# Patient Record
(demographics unavailable — no encounter records)

---

## 2024-11-11 NOTE — PHYSICAL EXAM
[General Appearance - Alert] : alert [General Appearance - In No Acute Distress] : in no acute distress [Full Pulse] : the pedal pulses are present [Edema] : there was no peripheral edema [Skin Color & Pigmentation] : normal skin color and pigmentation [Skin Turgor] : normal skin turgor [] : no rash [Right Foot Was Examined] : The right foot was examined [Left Foot Was Examined] : The left foot was examined [2+] : 2+ in the dorsalis pedis [Deep Tendon Reflexes (DTR)] : deep tendon reflexes were 2+ and symmetric [Sensation] : the sensory exam was normal to light touch and pinprick [No Focal Deficits] : no focal deficits [Oriented To Time, Place, And Person] : oriented to person, place, and time [Impaired Insight] : insight and judgment were intact [Affect] : the affect was normal [1+] : left foot dorsalis pedis 1+ [Skin Lesions] : no skin lesions [de-identified] : caryl bunions, R>L [FreeTextEntry1] : elongated nails x10

## 2024-11-11 NOTE — HISTORY OF PRESENT ILLNESS
[FreeTextEntry1] : Patient is a 38 yr/o female with a history of cerebral palsy who presents to clinic complaining of elongated nails and right bunion pain.  -she would like an injection to help with pain

## 2024-11-11 NOTE — ASSESSMENT
[FreeTextEntry1] : Patient seen and evaluated\par  Debridement of nails x 10 \par  Discussed Bunion deformity - No surgery at this time. \par  pt received custom made shoes.\par  Patient can follow up in 2 month for routine care

## 2024-11-11 NOTE — PHYSICAL EXAM
[General Appearance - Alert] : alert [General Appearance - In No Acute Distress] : in no acute distress [Edema] : there was no peripheral edema [Full Pulse] : the pedal pulses are present [Skin Color & Pigmentation] : normal skin color and pigmentation [Skin Turgor] : normal skin turgor [] : no rash [Right Foot Was Examined] : The right foot was examined [Left Foot Was Examined] : The left foot was examined [2+] : 2+ in the dorsalis pedis [Deep Tendon Reflexes (DTR)] : deep tendon reflexes were 2+ and symmetric [Sensation] : the sensory exam was normal to light touch and pinprick [No Focal Deficits] : no focal deficits [Oriented To Time, Place, And Person] : oriented to person, place, and time [Impaired Insight] : insight and judgment were intact [Affect] : the affect was normal [1+] : left foot dorsalis pedis 1+ [Skin Lesions] : no skin lesions [de-identified] : caryl bunions, R>L [FreeTextEntry1] : elongated nails x10

## 2024-12-16 NOTE — HISTORY OF PRESENT ILLNESS
[FreeTextEntry1] : Patient is a 38 yr/o female with a history of cerebral palsy who presents to clinic complaining of elongated nails and right bunion pain.   Patient has right foot xerosis and area of bunion patient states it is due to her crawling and ambulating at physical therapy

## 2024-12-16 NOTE — PHYSICAL EXAM
[General Appearance - Alert] : alert [General Appearance - In No Acute Distress] : in no acute distress [Full Pulse] : the pedal pulses are present [Edema] : there was no peripheral edema [Skin Color & Pigmentation] : normal skin color and pigmentation [Skin Turgor] : normal skin turgor [] : no rash [Right Foot Was Examined] : The right foot was examined [Left Foot Was Examined] : The left foot was examined [2+] : 2+ in the dorsalis pedis [Deep Tendon Reflexes (DTR)] : deep tendon reflexes were 2+ and symmetric [Sensation] : the sensory exam was normal to light touch and pinprick [No Focal Deficits] : no focal deficits [Oriented To Time, Place, And Person] : oriented to person, place, and time [Impaired Insight] : insight and judgment were intact [Affect] : the affect was normal [1+] : left foot dorsalis pedis 1+ [Skin Lesions] : no skin lesions [de-identified] : caryl bunions, R>L [FreeTextEntry1] : elongated nails x10

## 2025-01-10 NOTE — HISTORY OF PRESENT ILLNESS
[de-identified] : Patient here for evaluation right knee pain. Complains of joint line pain Positive mechanical symptoms and catching pt with CP in wheel chair, able to walk at times, pain with transfers  knee pain has gotten better after surgery  does crawl a lot and has swelling anterior knee  NAD Right knee No skin breakdown swelling and slight erythema anterior knee Medial joint line ttp Positive kevin Negative lachman Negative varus/valgus instability ROM 0-130 Pain with forced extension and flexion NVI Compartments soft and NT  Xray reviewed and significant for right knee mild degenerative changes  Plan went over findings explainedn the imaging and tx rec cons tx  pt will send abx for possible cellulitis over the prepatellar bursitis

## 2025-01-15 NOTE — PHYSICAL EXAM
[General Appearance - Alert] : alert [General Appearance - In No Acute Distress] : in no acute distress [Full Pulse] : the pedal pulses are present [Edema] : there was no peripheral edema [Skin Color & Pigmentation] : normal skin color and pigmentation [Skin Turgor] : normal skin turgor [] : no rash [Right Foot Was Examined] : The right foot was examined [Left Foot Was Examined] : The left foot was examined [2+] : 2+ in the dorsalis pedis [Deep Tendon Reflexes (DTR)] : deep tendon reflexes were 2+ and symmetric [Sensation] : the sensory exam was normal to light touch and pinprick [No Focal Deficits] : no focal deficits [Oriented To Time, Place, And Person] : oriented to person, place, and time [Impaired Insight] : insight and judgment were intact [Affect] : the affect was normal [1+] : left foot dorsalis pedis 1+ [Skin Lesions] : no skin lesions [de-identified] : caryl bunions, R>L [FreeTextEntry1] : elongated nails x10

## 2025-01-15 NOTE — HISTORY OF PRESENT ILLNESS
[FreeTextEntry1] : Patient is a 38 yr/o female with a history of cerebral palsy who presents to clinic complaining of elongated nails and right bunion pain.  -Right foot xerosis and area of bunion patient states it is due to her crawling and ambulating at physical therapy

## 2025-01-15 NOTE — PHYSICAL EXAM
[General Appearance - Alert] : alert [General Appearance - In No Acute Distress] : in no acute distress [Full Pulse] : the pedal pulses are present [Edema] : there was no peripheral edema [Skin Color & Pigmentation] : normal skin color and pigmentation [Skin Turgor] : normal skin turgor [] : no rash [Right Foot Was Examined] : The right foot was examined [Left Foot Was Examined] : The left foot was examined [2+] : 2+ in the dorsalis pedis [Deep Tendon Reflexes (DTR)] : deep tendon reflexes were 2+ and symmetric [Sensation] : the sensory exam was normal to light touch and pinprick [No Focal Deficits] : no focal deficits [Oriented To Time, Place, And Person] : oriented to person, place, and time [Impaired Insight] : insight and judgment were intact [Affect] : the affect was normal [1+] : left foot dorsalis pedis 1+ [Skin Lesions] : no skin lesions [de-identified] : caryl bunions, R>L [FreeTextEntry1] : elongated nails x10

## 2025-03-16 NOTE — HISTORY OF PRESENT ILLNESS
[de-identified] : Patient here for evaluation right knee pain. Complains of joint line pain Positive mechanical symptoms and catching pt with CP in wheel chair, able to walk at times, pain with transfers  knee pain has gotten better after surgery  does crawl a lot and has swelling anterior knee  NAD Right knee No skin breakdown erythema and swelling have decreased Medial joint line ttp Positive kevin Negative lachman Negative varus/valgus instability ROM 0-130 Pain with forced extension and flexion NVI Compartments soft and NT  Xray reviewed and significant for right knee mild degenerative changes  Plan went over findings explained the imaging and tx cont cons tx hep, pain control as needed

## 2025-03-16 NOTE — HISTORY OF PRESENT ILLNESS
[de-identified] : Patient here for evaluation right knee pain. Complains of joint line pain Positive mechanical symptoms and catching pt with CP in wheel chair, able to walk at times, pain with transfers  knee pain has gotten better after surgery  does crawl a lot and has swelling anterior knee  NAD Right knee No skin breakdown erythema and swelling have decreased Medial joint line ttp Positive kevin Negative lachman Negative varus/valgus instability ROM 0-130 Pain with forced extension and flexion NVI Compartments soft and NT  Xray reviewed and significant for right knee mild degenerative changes  Plan went over findings explained the imaging and tx cont cons tx hep, pain control as needed

## 2025-04-07 NOTE — HISTORY OF PRESENT ILLNESS
[FreeTextEntry1] : Patient is a 38 yr/o female with a history of cerebral palsy who presents to clinic complaining of elongated nails and right bunion pain.  -Right foot xerosis and area of bunion patient states it is due to her crawling and ambulating at physical therapy  
bed rails

## 2025-04-07 NOTE — PHYSICAL EXAM
[General Appearance - Alert] : alert [General Appearance - In No Acute Distress] : in no acute distress [Full Pulse] : the pedal pulses are present [Edema] : there was no peripheral edema [Skin Color & Pigmentation] : normal skin color and pigmentation [Skin Turgor] : normal skin turgor [] : no rash [Right Foot Was Examined] : The right foot was examined [Left Foot Was Examined] : The left foot was examined [2+] : 2+ in the dorsalis pedis [Deep Tendon Reflexes (DTR)] : deep tendon reflexes were 2+ and symmetric [Sensation] : the sensory exam was normal to light touch and pinprick [No Focal Deficits] : no focal deficits [Oriented To Time, Place, And Person] : oriented to person, place, and time [Impaired Insight] : insight and judgment were intact [Affect] : the affect was normal [1+] : left foot dorsalis pedis 1+ [de-identified] : caryl bunions, R>L [Skin Lesions] : no skin lesions [FreeTextEntry1] : elongated nails x10

## 2025-06-12 NOTE — HISTORY OF PRESENT ILLNESS
[de-identified] : Patient here for evaluation right knee pain. Complains of joint line pain Positive mechanical symptoms and catching pt with CP in wheel chair, able to walk at times, pain with transfers  knee pain has gotten better after surgery  does crawl a lot and has swelling anterior knee  NAD Right knee No skin breakdown erythema and swelling have decreased Medial joint line ttp Positive kevin Negative lachman Negative varus/valgus instability ROM 0-130 Pain with forced extension and flexion NVI Compartments soft and NT  Xray reviewed and significant for right knee mild degenerative changes  Plan went over findings explained the imaging and tx cont cons tx hep, pain control as needed due to pain, will proceed with visco and will order

## 2025-07-17 NOTE — HISTORY OF PRESENT ILLNESS
[de-identified] : Patient here for evaluation right knee pain. Complains of joint line pain Positive mechanical symptoms and catching pt with CP in wheel chair, able to walk at times, pain with transfers  knee pain has gotten better after surgery  does crawl a lot and has swelling anterior knee  NAD Right knee No skin breakdown erythema and swelling have decreased Medial joint line ttp Positive kevin Negative lachman Negative varus/valgus instability ROM 0-130 Pain with forced extension and flexion NVI Compartments soft and NT  Xray reviewed and significant for right knee mild degenerative changes  Plan went over findings explained the imaging and tx cont cons tx hep, pain control as needed right knee injection  Large Joint Injection was performed because of pain/rom. Anesthesia: ethyl chloride sprayed topically. 6cc synvisc 1 Medication was injected in the right knee. Patient has tried OTC's including aspirin, Ibuprofen, Aleve etc or prescription NSAIDS, and/or exercises at home and/ or physical therapy without satisfactory response. After verbal consent using sterile preparation and technique. The risks, benefits, and alternatives to cortisone injection were explained in full to the patient. Risks outlined include but are not limited to infection, sepsis, bleeding, scarring, skin discoloration, temporary increase in pain, syncopal episode, failure to resolve symptoms, allergic reaction, symptom recurrence, and elevation of blood sugar in diabetics. Patient understood the risks. All questions were answered. After discussion of options, patient requested an injection. Oral informed consent was obtained and sterile prep was done of the injection site. Sterile technique was utilized for the procedure including the preparation of the solutions used for the injection. Patient tolerated the procedure well. Advised to ice the injection site this evening. Prep with alcohol locally to site. Sterile technique used.